# Patient Record
Sex: FEMALE | Race: BLACK OR AFRICAN AMERICAN
[De-identification: names, ages, dates, MRNs, and addresses within clinical notes are randomized per-mention and may not be internally consistent; named-entity substitution may affect disease eponyms.]

---

## 2018-07-05 ENCOUNTER — HOSPITAL ENCOUNTER (EMERGENCY)
Dept: HOSPITAL 72 - EMR | Age: 18
Discharge: HOME | End: 2018-07-05
Payer: COMMERCIAL

## 2018-07-05 VITALS — BODY MASS INDEX: 16.66 KG/M2 | WEIGHT: 100 LBS | HEIGHT: 65 IN

## 2018-07-05 VITALS — DIASTOLIC BLOOD PRESSURE: 56 MMHG | SYSTOLIC BLOOD PRESSURE: 100 MMHG

## 2018-07-05 VITALS — SYSTOLIC BLOOD PRESSURE: 100 MMHG | DIASTOLIC BLOOD PRESSURE: 56 MMHG

## 2018-07-05 DIAGNOSIS — S00.83XA: Primary | ICD-10-CM

## 2018-07-05 DIAGNOSIS — Z23: ICD-10-CM
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
## 2018-07-05 DIAGNOSIS — S16.1XXA: ICD-10-CM
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
## 2018-07-05 DIAGNOSIS — S70.312A: ICD-10-CM

## 2018-07-05 PROCEDURE — 90471 IMMUNIZATION ADMIN: CPT

## 2018-07-05 PROCEDURE — 70150 X-RAY EXAM OF FACIAL BONES: CPT

## 2018-07-05 PROCEDURE — 90715 TDAP VACCINE 7 YRS/> IM: CPT

## 2018-07-05 PROCEDURE — 99283 EMERGENCY DEPT VISIT LOW MDM: CPT
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
## 2018-07-05 NOTE — DIAGNOSTIC IMAGING REPORT
Indication: Pain

 

Technique: XRAY Facial Bones Complete

 

Comparison: None

 

Findings: No definite/displaced facial fracture is appreciated. The paranasal sinuses

and mastoid air cells are grossly clear. Imaged cervical spine unremarkable. No

prevertebral soft tissue abnormality is appreciated.

 

IMPRESSION: 

No definite/displaced facial fracture appreciated. More sensitive evaluation with CT

of the facial bones can be obtained as clinically indicated.

## 2022-09-08 ENCOUNTER — HOSPITAL ENCOUNTER (OUTPATIENT)
Dept: HOSPITAL 87 - 8 EST A/PP | Age: 22
Setting detail: OBSERVATION
Discharge: HOME | End: 2022-09-08
Attending: OBSTETRICS & GYNECOLOGY | Admitting: OBSTETRICS & GYNECOLOGY
Payer: MEDICAID

## 2022-09-08 VITALS — WEIGHT: 139 LBS | HEIGHT: 64 IN | BODY MASS INDEX: 23.73 KG/M2

## 2022-09-08 DIAGNOSIS — O26.613: Primary | ICD-10-CM

## 2022-09-08 DIAGNOSIS — Z3A.30: ICD-10-CM

## 2022-09-08 DIAGNOSIS — K83.1: ICD-10-CM

## 2022-09-08 LAB — CHLORIDE SERPL-SCNC: 102 MEQ/L (ref 98–107)

## 2022-09-08 PROCEDURE — 36415 COLL VENOUS BLD VENIPUNCTURE: CPT

## 2022-09-08 PROCEDURE — 80053 COMPREHEN METABOLIC PANEL: CPT

## 2022-09-08 PROCEDURE — 59025 FETAL NON-STRESS TEST: CPT

## 2022-09-08 PROCEDURE — 76705 ECHO EXAM OF ABDOMEN: CPT

## 2022-09-08 PROCEDURE — 76818 FETAL BIOPHYS PROFILE W/NST: CPT

## 2022-09-08 PROCEDURE — 99281 EMR DPT VST MAYX REQ PHY/QHP: CPT

## 2022-09-08 PROCEDURE — 76805 OB US >/= 14 WKS SNGL FETUS: CPT

## 2022-09-08 PROCEDURE — 82239 BILE ACIDS TOTAL: CPT

## 2022-10-27 ENCOUNTER — HOSPITAL ENCOUNTER (OUTPATIENT)
Dept: HOSPITAL 87 - 8 EST LDRP | Age: 22
Setting detail: OBSERVATION
Discharge: HOME | End: 2022-10-27
Attending: OBSTETRICS & GYNECOLOGY | Admitting: OBSTETRICS & GYNECOLOGY
Payer: MEDICAID

## 2022-10-27 VITALS — BODY MASS INDEX: 25.71 KG/M2 | WEIGHT: 160 LBS | HEIGHT: 66 IN

## 2022-10-27 DIAGNOSIS — O26.893: Primary | ICD-10-CM

## 2022-10-27 DIAGNOSIS — R03.0: ICD-10-CM

## 2022-10-27 DIAGNOSIS — Z3A.37: ICD-10-CM

## 2022-10-27 DIAGNOSIS — Z79.899: ICD-10-CM

## 2022-10-27 LAB
AMPHETAMINES UR QL SCN: NEGATIVE
APPEARANCE UR: (no result)
APTT PPP: 27.8 SEC (ref 23.4–31)
BARBITURATES UR QL SCN: NEGATIVE
BENZODIAZ UR QL SCN: NEGATIVE
BZE UR QL SCN: NEGATIVE
CANNABINOIDS UR QL SCN: NEGATIVE
CHLORIDE SERPL-SCNC: 104 MEQ/L (ref 98–107)
COLOR UR: (no result)
D DIMER PPP FEU-MCNC: 1.57 MG/L FEU (ref ?–0.5)
EOSINOPHIL NFR BLD MANUAL: 2 % (ref 0–5)
ERYTHROCYTE [DISTWIDTH] IN BLOOD BY AUTOMATED COUNT: 12.9 % (ref 11.6–14.6)
FIBRINOGEN PPP-MCNC: 444 MG/DL (ref 200–400)
HCT VFR BLD AUTO: 36.2 % (ref 36–48)
HGB BLD-MCNC: 12.3 G/DL (ref 12–16)
HGB UR QL STRIP: NEGATIVE
INR PPP: 0.9
KETONES UR STRIP-MCNC: (no result) MG/DL
LEUKOCYTE ESTERASE UR QL STRIP: NEGATIVE
LYMPHOCYTES NFR BLD MANUAL: 33 % (ref 20–60)
MCH RBC QN AUTO: 31.5 PG (ref 28–32)
MCV RBC AUTO: 92.5 FL (ref 81–99)
METHADONE UR QL SCN: NEGATIVE
MONOCYTES NFR BLD MANUAL: 15 % (ref 2–8)
NEUTS BAND NFR BLD MANUAL: 3 % (ref 1–6)
NEUTS SEG NFR BLD MANUAL: 47 % (ref 45–75)
NITRITE UR QL STRIP: NEGATIVE
OPIATES UR QL SCN: NEGATIVE
PCP UR QL SCN: NEGATIVE
PH UR STRIP: 7.5 [PH] (ref 4.5–8)
PLATELET # BLD AUTO: 166 X1000/UL (ref 130–400)
PLATELET # BLD EST: NORMAL 10*3/UL
PMV BLD AUTO: 9.9 FL (ref 7.4–10.4)
PROT UR QL STRIP: (no result)
PROTHROMBIN TIME: 9.8 SEC (ref 9.6–11)
RBC # BLD AUTO: 3.92 MILL/UL (ref 4.2–5.4)
SP GR UR STRIP: 1.02 (ref 1–1.03)
UROBILINOGEN UR STRIP-MCNC: 0.2 E.U./DL (ref 0.2–1)

## 2022-10-27 PROCEDURE — 85610 PROTHROMBIN TIME: CPT

## 2022-10-27 PROCEDURE — G0379 DIRECT REFER HOSPITAL OBSERV: HCPCS

## 2022-10-27 PROCEDURE — 36415 COLL VENOUS BLD VENIPUNCTURE: CPT

## 2022-10-27 PROCEDURE — 76805 OB US >/= 14 WKS SNGL FETUS: CPT

## 2022-10-27 PROCEDURE — 85730 THROMBOPLASTIN TIME PARTIAL: CPT

## 2022-10-27 PROCEDURE — 80305 DRUG TEST PRSMV DIR OPT OBS: CPT

## 2022-10-27 PROCEDURE — 80053 COMPREHEN METABOLIC PANEL: CPT

## 2022-10-27 PROCEDURE — 81003 URINALYSIS AUTO W/O SCOPE: CPT

## 2022-10-27 PROCEDURE — 59025 FETAL NON-STRESS TEST: CPT

## 2022-10-27 PROCEDURE — 84550 ASSAY OF BLOOD/URIC ACID: CPT

## 2022-10-27 PROCEDURE — 76818 FETAL BIOPHYS PROFILE W/NST: CPT

## 2022-10-27 PROCEDURE — 85025 COMPLETE CBC W/AUTO DIFF WBC: CPT

## 2022-10-27 PROCEDURE — 85384 FIBRINOGEN ACTIVITY: CPT

## 2022-10-27 PROCEDURE — 85379 FIBRIN DEGRADATION QUANT: CPT
